# Patient Record
Sex: FEMALE | Race: BLACK OR AFRICAN AMERICAN | Employment: OTHER | URBAN - METROPOLITAN AREA
[De-identification: names, ages, dates, MRNs, and addresses within clinical notes are randomized per-mention and may not be internally consistent; named-entity substitution may affect disease eponyms.]

---

## 2017-02-20 PROBLEM — E66.09 NON MORBID OBESITY DUE TO EXCESS CALORIES: Status: ACTIVE | Noted: 2017-02-20

## 2017-12-22 ENCOUNTER — HOSPITAL ENCOUNTER (EMERGENCY)
Age: 47
Discharge: HOME | End: 2017-12-22
Attending: EMERGENCY MEDICINE
Payer: COMMERCIAL

## 2017-12-22 ENCOUNTER — APPOINTMENT (OUTPATIENT)
Dept: GENERAL RADIOLOGY | Age: 47
End: 2017-12-22
Attending: PHYSICIAN ASSISTANT
Payer: COMMERCIAL

## 2017-12-22 VITALS
SYSTOLIC BLOOD PRESSURE: 178 MMHG | RESPIRATION RATE: 18 BRPM | HEART RATE: 67 BPM | TEMPERATURE: 98.6 F | DIASTOLIC BLOOD PRESSURE: 101 MMHG
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
## 2017-12-22 DIAGNOSIS — R03.0 ELEVATED BLOOD PRESSURE READING: ICD-10-CM

## 2017-12-22 DIAGNOSIS — M25.475 SWELLING OF FIRST METATARSOPHALANGEAL (MTP) JOINT OF LEFT FOOT: ICD-10-CM

## 2017-12-22 PROCEDURE — 99283 EMERGENCY DEPT VISIT LOW MDM: CPT

## 2017-12-22 PROCEDURE — 73630 X-RAY EXAM OF FOOT: CPT

## 2017-12-22 RX ORDER — HYDROCODONE BITARTRATE AND ACETAMINOPHEN 5; 325 MG/1; MG/1
1 TABLET ORAL
Qty: 12 TAB | Refills: 0 | Status: SHIPPED | OUTPATIENT
Start: 2017-12-22 | End: 2018-01-25

## 2017-12-22 RX ORDER — NAPROXEN 500 MG/1
500 TABLET ORAL
Qty: 20 TAB | Refills: 0 | Status: SHIPPED | OUTPATIENT
Start: 2017-12-22 | End: 2018-02-21 | Stop reason: SDUPTHER

## 2017-12-22 NOTE — ED PROVIDER NOTES
EMERGENCY DEPARTMENT HISTORY AND PHYSICAL EXAM      Date: 2017  Patient Name: Belinda Arndt    History of Presenting Illness     Chief Complaint   Patient presents with    Foot Pain     non traumatic left foot pain x three days       History Provided By: Patient    HPI: Belinda Arndt, 56 y.o. female with PMHx significant for bunion, HTN, and arthritis, presents herself to the ED with cc of progressively worsening sharp left foot pain radiating up her left leg x 3 days. Pt denies any recent injury to her left foot but notes of increase of exercise lately. She notes exacerbation of pain w/ ambulation. She reports of taking 2 Aleve at 9:00 AM today (17) but found no significant improvement of pain. Pt denies being seen by a podiatrist or an orthopedist prior to ED visit. Note: Pt has driven herself to [facility].     PCP: Gilberto Diaz MD    There are no other complaints, changes, or physical findings at this time. Current Outpatient Prescriptions   Medication Sig Dispense Refill    HYDROcodone-acetaminophen (NORCO) 5-325 mg per tablet Take 1 Tab by mouth every six (6) hours as needed for Pain. Max Daily Amount: 4 Tabs. 12 Tab 0    naproxen (NAPROSYN) 500 mg tablet Take 1 Tab by mouth every twelve (12) hours as needed for Pain. 20 Tab 0    hydroCHLOROthiazide (HYDRODIURIL) 25 mg tablet TAKE ONE TABLET BY MOUTH ONCE DAILY 90 Tab 4    amLODIPine (NORVASC) 5 mg tablet Take 1 Tab by mouth daily.  80 Tab 4       Past History     Past Medical History:  Past Medical History:   Diagnosis Date    Benign essential HTN 2016    Hypertension        Past Surgical History:  Past Surgical History:   Procedure Laterality Date    HX  SECTION      ensure tubal blockade       Family History:  Family History   Problem Relation Age of Onset    Hypertension Mother     Hypertension Maternal Grandmother     Diabetes Maternal Grandmother     Cancer Maternal Grandmother      breast ca       Social History:  Social History   Substance Use Topics    Smoking status: Never Smoker    Smokeless tobacco: Never Used    Alcohol use Yes       Allergies: Allergies   Allergen Reactions    Lisinopril Cough     Review of Systems   Review of Systems   Constitutional: Negative. Negative for fever. HENT: Negative. Eyes: Negative. Respiratory: Negative. Cardiovascular: Negative. Gastrointestinal: Negative. Negative for constipation, diarrhea, nausea and vomiting. Denies liver disease   Endocrine:        Denies thyroid disease   Genitourinary: Negative. Negative for dysuria. Denies kidney disease   Musculoskeletal: Positive for myalgias (left foot). Skin: Negative. Neurological: Negative. All other systems reviewed and are negative. Physical Exam   Physical Exam   Constitutional: She is oriented to person, place, and time. She appears well-developed and well-nourished. No distress. HENT:   Head: Normocephalic and atraumatic. Right Ear: External ear normal.   Left Ear: External ear normal.   Nose: Nose normal.   Mouth/Throat: Oropharynx is clear and moist. No oropharyngeal exudate. Eyes: Conjunctivae and EOM are normal. Pupils are equal, round, and reactive to light. Right eye exhibits no discharge. Left eye exhibits no discharge. No scleral icterus. Neck: Normal range of motion. Neck supple. No tracheal deviation present. Cardiovascular: Normal rate, regular rhythm, normal heart sounds and intact distal pulses. Exam reveals no gallop and no friction rub. No murmur heard. Pulmonary/Chest: Effort normal and breath sounds normal. No respiratory distress. She has no wheezes. She has no rales. She exhibits no tenderness. Abdominal: Soft. Bowel sounds are normal. She exhibits no distension and no mass. There is no tenderness. There is no rebound and no guarding. Musculoskeletal:   Tenderness over the 1st left NTP. Slight medial deformities to BL foot (1st NTP).  No red streak. No bony tenderness over the left foot and hind. No erythema or edema. Capillary refill <3 seconds. Lymphadenopathy:     She has no cervical adenopathy. Neurological: She is alert and oriented to person, place, and time. No cranial nerve deficit. Skin: Skin is warm and dry. No rashes, lesions, or cracked skin to the left foot. Psychiatric: She has a normal mood and affect. Her behavior is normal.   Nursing note and vitals reviewed. Diagnostic Study Results     Radiologic Studies -   XR FOOT LT MIN 3 V   Final Result   EXAM:  XR FOOT LT MIN 3 V     INDICATION:   pain in medial eminence and lateral side of foot.     COMPARISON:  None.     FINDINGS:  Three views of the left foot demonstrate no fracture or other acute  osseous or articular abnormality. There is ventral soft tissue swelling.     IMPRESSION  IMPRESSION:  Soft tissue swelling. Medical Decision Making   I am the first provider for this patient. I reviewed the vital signs, available nursing notes, past medical history, past surgical history, family history and social history. Vital Signs-Reviewed the patient's vital signs. Patient Vitals for the past 12 hrs:   Temp Pulse Resp BP   12/22/17 1547 98.6 °F (37 °C) 66 18 (!) 178/100     Records Reviewed: Old Medical Records    Provider Notes (Medical Decision Making):   DDx: sprain, strain, fracture, bunion, tendonitis, arthritis, repetitive use of left foot    10:58 PM  I was personally available for consultation in the emergency department. I have reviewed the chart and agree with the documentation recorded by the [facility], including the assessment, treatment plan, and disposition. Kathy Moreno. Asia Boggs MD      ED Course:   Initial assessment performed. The patients presenting problems have been discussed, and they are in agreement with the care plan formulated and outlined with them. I have encouraged them to ask questions as they arise throughout their visit.     Procedure Note - Ace Wrap Placement:  6:04 PM  Performed by: Grace Ramachandran PA-C  Neurovascularly intact prior to tx. An Ace Wrap was placed on pt's left foot. Joint was placed in neutral position. Neurovascularly intact after tx. The procedure took 1-15 minutes, and pt tolerated well. Written by Mary Lezama, ED Scribe, as dictated by Grace Ramachandran PA-C. PROGRESS NOTE:   6:16 PM  Pt has been re-examined by Grace Ramachandran PA-C. Pt denies any lightheadedness or blurred vision. Recommends having her blood pressure checked by her PCP when she f/u with him. Discharge Note:  6:16 PM  The pt is ready for discharge. The pt's signs, symptoms, diagnosis, and discharge instructions have been discussed and pt has conveyed their understanding. The pt is to follow up as recommended or return to ER should their symptoms worsen. Plan has been discussed and pt is in agreement. PLAN:  1. Current Discharge Medication List      START taking these medications    Details   HYDROcodone-acetaminophen (NORCO) 5-325 mg per tablet Take 1 Tab by mouth every six (6) hours as needed for Pain. Max Daily Amount: 4 Tabs. Qty: 12 Tab, Refills: 0    Associated Diagnoses: Repetitive use syndrome      naproxen (NAPROSYN) 500 mg tablet Take 1 Tab by mouth every twelve (12) hours as needed for Pain. Qty: 20 Tab, Refills: 0           2. Follow-up Information     Follow up With Details Comments Tara [address], [address] [facility]      Vivian Velasquez MD  orthopedic surgeon, if no improvement [address]  [facility]  [phone]      [facility] EMERGENCY DEPT  If symptoms worsen [address]  [address] [facility]  [phone]        Return to ED if worse     Diagnosis     Clinical Impression:   1. Repetitive use syndrome    2. Swelling of first metatarsophalangeal (MTP) joint of left foot    3. Elevated blood pressure reading        Attestations:   This note is prepared by [employer], acting as Scribe for [employer]. Amee Shelton PA-C: The scribe's documentation has been prepared under my direction and personally reviewed by me in its entirety. I confirm that the note above accurately reflects all work, treatment, procedures, and medical decision making performed by me.

## 2017-12-22 NOTE — DISCHARGE INSTRUCTIONS
Elevated Blood Pressure: Care Instructions  Your Care Instructions    Blood pressure is a measure of how hard the blood pushes against the walls of your arteries. It's normal for blood pressure to go up and down throughout the day. But if it stays up over time, you have high blood pressure. Two numbers tell you your blood pressure. The first number is the systolic pressure. It shows how hard the blood pushes when your heart is pumping. The second number is the diastolic pressure. It shows how hard the blood pushes between heartbeats, when your heart is relaxed and filling with blood. An ideal blood pressure in adults is less than 120/80 (say \"120 over 80\"). High blood pressure is 140/90 or higher. You have high blood pressure if your top number is 140 or higher or your bottom number is 90 or higher, or both. The main test for high blood pressure is simple, fast, and painless. To diagnose high blood pressure, your doctor will test your blood pressure at different times. After testing your blood pressure, your doctor may ask you to test it again when you are home. If you are diagnosed with high blood pressure, you can work with your doctor to make a long-term plan to manage it. Follow-up care is a key part of your treatment and safety. Be sure to make and go to all appointments, and call your doctor if you are having problems. It's also a good idea to know your test results and keep a list of the medicines you take. How can you care for yourself at home? · Do not smoke. Smoking increases your risk for heart attack and stroke. If you need help quitting, talk to your doctor about stop-smoking programs and medicines. These can increase your chances of quitting for good. · Stay at a healthy weight. · Try to limit how much sodium you eat to less than 2,300 milligrams (mg) a day. Your doctor may ask you to try to eat less than 1,500 mg a day. · Be physically active.  Get at least 30 minutes of exercise on most days of the week. Walking is a good choice. You also may want to do other activities, such as running, swimming, cycling, or playing tennis or team sports. · Avoid or limit alcohol. Talk to your doctor about whether you can drink any alcohol. · Eat plenty of fruits, vegetables, and low-fat dairy products. Eat less saturated and total fats. · Learn how to check your blood pressure at home. When should you call for help? Call your doctor now or seek immediate medical care if:  ? · Your blood pressure is much higher than normal (such as 180/110 or higher). ? · You think high blood pressure is causing symptoms such as:  ¨ Severe headache. ¨ Blurry vision. ? Watch closely for changes in your health, and be sure to contact your doctor if:  ? · You do not get better as expected. Where can you learn more? Go to http://miguelangel-edwina.info/. Enter H884 in the search box to learn more about \"Elevated Blood Pressure: Care Instructions. \"  Current as of: September 21, 2016  Content Version: 11.4  © 6098-6569 [employer]wise, [employer]. Care instructions adapted under license by [employer] (which disclaims liability or warranty for this information). If you have questions about a medical condition or this instruction, always ask your healthcare professional. [address] any warranty or liability for your use of this information.

## 2017-12-22 NOTE — ED NOTES
ANYA Harrison reviewed discharge instructions with the patient. The patient verbalized understanding. All questions and concerns were addressed. The patient declined a wheelchair and is discharged ambulatory in the care of family members with instructions and prescriptions in hand. Pt is alert and oriented x 4. Respirations are clear and unlabored.

## 2019-11-14 PROBLEM — E66.01 SEVERE OBESITY (HCC): Status: ACTIVE | Noted: 2019-11-14

## 2021-03-19 PROBLEM — M10.9 GOUT: Status: ACTIVE | Noted: 2020-10-01

## 2021-07-15 NOTE — ED NOTES
Complaint of left foot pain x 3 days. Pain is localized to medial eminence and lateral side of foot. These areas are painful to even light touch. The lateral side of the foot appears to be minimally swollen.  Pt denies injury Continue current management.

## 2022-03-19 PROBLEM — M10.9 GOUT: Status: ACTIVE | Noted: 2020-10-01

## 2022-03-19 PROBLEM — E66.01 MORBID OBESITY (HCC): Status: ACTIVE | Noted: 2019-11-14

## 2022-03-19 PROBLEM — E66.09 NON MORBID OBESITY DUE TO EXCESS CALORIES: Status: ACTIVE | Noted: 2017-02-20

## 2022-07-13 ENCOUNTER — HOSPITAL ENCOUNTER (OUTPATIENT)
Dept: GENERAL RADIOLOGY | Age: 52
Discharge: HOME | End: 2022-07-13
Payer: COMMERCIAL

## 2022-07-13 DIAGNOSIS — Z01.818 PRE-OP EXAMINATION: ICD-10-CM

## 2022-07-13 PROCEDURE — 71046 X-RAY EXAM CHEST 2 VIEWS: CPT

## 2022-10-19 ENCOUNTER — HOSPITAL ENCOUNTER (OUTPATIENT)
Dept: WOUND CARE | Age: 52
Discharge: HOME | End: 2022-10-19
Payer: COMMERCIAL

## 2022-10-19 VITALS
RESPIRATION RATE: 16 BRPM | HEART RATE: 79 BPM | DIASTOLIC BLOOD PRESSURE: 105 MMHG | SYSTOLIC BLOOD PRESSURE: 170 MMHG | TEMPERATURE: 98.3 F

## 2022-10-19 PROBLEM — S21.009A: Status: ACTIVE | Noted: 2022-10-19

## 2022-10-19 PROCEDURE — 99203 OFFICE O/P NEW LOW 30 MIN: CPT

## 2022-10-19 PROCEDURE — 99213 OFFICE O/P EST LOW 20 MIN: CPT

## 2022-10-19 NOTE — WOUND CARE
10/19/22 0920   [REMOVED] Wound Breast Left 10/19/22   Final Assessment Date/Final Assessment Time: 10/19/22 1019  Date First Assessed/Time First Assessed: 10/19/22 0920   Present on Hospital Admission: Yes  Wound Approximate Age at First Assessment (Weeks): 6 weeks  Primary Wound Type: Open incision/surg. .. Wound Image    Wound Etiology Surgical   Dressing Status Intact; Old drainage noted   Cleansed Cleansed with saline   Wound Length (cm) 6 cm   Wound Width (cm) 5 cm   Wound Depth (cm) 0.2 cm   Wound Surface Area (cm^2) 30 cm^2   Wound Volume (cm^3) 6 cm^3   Wound Assessment [place]/red;Slough   Drainage Amount Moderate   Drainage Description Serosanguinous   Wound Odor None   Tiffany-Wound/Incision Assessment Intact   Edges Flat/open edges   Wound Thickness Description Full thickness   [REMOVED] Wound Breast Right 10/19/22   Final Assessment Date/Final Assessment Time: 10/19/22 1019  Date First Assessed/Time First Assessed: 10/19/22 0920   Present on Hospital Admission: Yes  Wound Approximate Age at First Assessment (Weeks): 6 weeks  Primary Wound Type: Open incision/surg. .. Wound Image    Wound Etiology Surgical   Dressing Status Intact; Old drainage noted   Cleansed Cleansed with saline   Wound Length (cm) 4.3 cm   Wound Width (cm) 3.2 cm   Wound Depth (cm) 0.2 cm   Wound Surface Area (cm^2) 13.76 cm^2   Wound Volume (cm^3) 2.752 cm^3   Wound Assessment [place]/red;Slough   Drainage Amount Moderate   Drainage Description Serosanguinous   Wound Odor None   Tiffany-Wound/Incision Assessment Intact   Edges Flat/open edges   Wound Thickness Description Full thickness   Pain 1   Pain Scale 1 Numeric (0 - 10)   Pain Intensity 1 0   Visit Vitals  BP (!) 170/105 (BP 1 Location: Left upper arm, BP Patient Position: At rest;Sitting) Comment: pt asymptomatic. MD notified.    Pulse 79   Temp 98.3 °F (36.8 °C)   Resp 16

## 2022-10-19 NOTE — WOUND CARE
10/19/22 0920   Wound Breast Left 10/19/22   Date First Assessed/Time First Assessed: 10/19/22 0920   Present on Hospital Admission: Yes  Wound Approximate Age at First Assessment (Weeks): 6 weeks  Primary Wound Type: Open incision/surgical site  Location: Breast  Wound Location Orientation: Lef. .. Wound Image    Wound Etiology Surgical   Dressing Status Intact; Old drainage noted   Cleansed Cleansed with saline   Wound Length (cm) 6 cm   Wound Width (cm) 5 cm   Wound Depth (cm) 0.2 cm   Wound Surface Area (cm^2) 30 cm^2   Wound Volume (cm^3) 6 cm^3   Wound Assessment [place]/red;Slough   Drainage Amount Moderate   Drainage Description Serosanguinous   Wound Odor None   Tiffany-Wound/Incision Assessment Intact   Edges Flat/open edges   Wound Thickness Description Full thickness   Wound Breast Right 10/19/22   Date First Assessed/Time First Assessed: 10/19/22 0920   Present on Hospital Admission: Yes  Wound Approximate Age at First Assessment (Weeks): 6 weeks  Primary Wound Type: Open incision/surgical site  Location: Breast  Wound Location Orientation: Rig. .. Wound Image    Wound Etiology Surgical   Dressing Status Intact; Old drainage noted   Cleansed Cleansed with saline   Wound Length (cm) 4.3 cm   Wound Width (cm) 3.2 cm   Wound Depth (cm) 0.2 cm   Wound Surface Area (cm^2) 13.76 cm^2   Wound Volume (cm^3) 2.752 cm^3   Wound Assessment [place]/red;Slough   Drainage Amount Moderate   Drainage Description Serosanguinous   Wound Odor None   Tiffany-Wound/Incision Assessment Intact   Edges Flat/open edges   Wound Thickness Description Full thickness   Pain 1   Pain Scale 1 Numeric (0 - 10)   Pain Intensity 1 0   Visit Vitals  BP (!) 170/105 (BP 1 Location: Left upper arm, BP Patient Position: At rest;Sitting)   Pulse 79   Temp 98.3 °F (36.8 °C)   Resp 16

## 2022-10-19 NOTE — WOUND CARE
Wound Care Consultation        Assessment:     47 y.o. 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 Questions answered. History of Present Illness:      Chandler Overton is a 47 y.o. female  female for follow up of Bilateral  Surgical ulcer to the  nipple necrosis  Ulcer has been present for more than one month now. Patient had breast reduction surgery in [place] on 22, she was told that her nipple may get necrosed. She has been in contact with surgeon through phone and tele visits. She does her own dressing changes with betadine. She denies any drainage/pain/fever/chills. Offloading wound: n/a  Appetite: good  Wound associated pain: none  Diabetic: no    Smoker:No      Past Medical History:   Diagnosis Date    Benign essential HTN 2016    Gout 10/2020    Ill-defined condition     Obesity (BMI 30.0-34. 9)      Past Surgical History:   Procedure Laterality Date    HX  SECTION      ensure tubal blockade    HX COLONOSCOPY  2022    f/u 10 yrs     HX OTHER SURGICAL  2022    bilateral breast reduction    HX WISDOM TEETH EXTRACTION        Family History   Problem Relation Age of Onset    Hypertension Mother     Cancer Mother         bladder    Hypertension Maternal Grandmother     Diabetes Maternal Grandmother     Cancer Maternal Grandmother         breast ca    Other Father         diverticulitis    Breast Cancer Sister      Social History     Socioeconomic History    Marital status:    Occupational History    Occupation: LPC, Nursing student   Tobacco Use    Smoking status: Never    Smokeless tobacco: Never   Vaping Use    Vaping Use: Never used   Substance and Sexual Activity    Alcohol use:  Yes     Alcohol/week: 1.0 standard drink     Types: 1 Glasses of wine per week     Comment: occasional; holidays     Drug use: No    Sexual activity: Yes     Partners: Male     Birth control/protection: Condom     Comment:  \"[place]\"      Current Outpatient Medications   Medication Sig    losartan (COZAAR) 100 mg tablet Take 1 Tablet by mouth daily. fluconazole (DIFLUCAN) 150 mg tablet Take 1 Tablet by mouth daily. FDA advises cautious prescribing of oral fluconazole in pregnancy. furosemide (LASIX) 40 mg tablet Take 1 Tablet by mouth daily. cloNIDine HCL (CATAPRES) 0.2 mg tablet Take 1 Tablet by mouth nightly. allopurinoL (ZYLOPRIM) 100 mg tablet Take 2 Tablets by mouth daily. ibuprofen (MOTRIN) 600 mg tablet Take 1 Tab by mouth every six (6) hours as needed for Pain. No current facility-administered medications for this encounter. Allergies   Allergen Reactions    Lisinopril Cough       Review of Systems:  A comprehensive review of systems was negative except for that written in the History of Present Illness. Physical Exam:     VS:   Vitals:    10/19/22 0920   BP: (!) 170/105   Pulse: 79   Resp: 16   Temp: 98.3 °F (36.8 °C)        General:  alert and oriented times 3   Ears: ENT exam normal, no neck nodes or sinus tenderness. Chest: CTA b.l  Heart: RRR S1S2  Abdomen: soft, NT, ND, BS+  Extremities: no edema         [REMOVED] Wound Breast Left 10/19/22 (Removed)   Wound Image   10/19/22 0920   Wound Etiology Surgical 10/19/22 0920   Dressing Status Intact; Old drainage noted 10/19/22 0920   Cleansed Cleansed with saline 10/19/22 0920   Wound Length (cm) 6 cm 10/19/22 0920   Wound Width (cm) 5 cm 10/19/22 0920   Wound Depth (cm) 0.2 cm 10/19/22 0920   Wound Surface Area (cm^2) 30 cm^2 10/19/22 0920   Wound Volume (cm^3) 6 cm^3 10/19/22 0920   Wound Assessment Pink/red;Slough 10/19/22 0920   Drainage Amount Moderate 10/19/22 0920   Drainage Description Serosanguinous 10/19/22 0920   Wound Odor None 10/19/22 0920 Tiffany-Wound/Incision Assessment Intact 10/19/22 0920   Edges Flat/open edges 10/19/22 0920   Wound Thickness Description Full thickness 10/19/22 0920   Number of days: 0       [REMOVED] Wound Breast Right 10/19/22 (Removed)   Wound Image   10/19/22 0920   Wound Etiology Surgical 10/19/22 0920   Dressing Status Intact; Old drainage noted 10/19/22 0920   Cleansed Cleansed with saline 10/19/22 0920   Wound Length (cm) 4.3 cm 10/19/22 0920   Wound Width (cm) 3.2 cm 10/19/22 0920   Wound Depth (cm) 0.2 cm 10/19/22 0920   Wound Surface Area (cm^2) 13.76 cm^2 10/19/22 0920   Wound Volume (cm^3) 2.752 cm^3 10/19/22 0920   Wound Assessment Pink/red;Slough 10/19/22 0920   Drainage Amount Moderate 10/19/22 0920   Drainage Description Serosanguinous 10/19/22 0920   Wound Odor None 10/19/22 0920   Tiffany-Wound/Incision Assessment Intact 10/19/22 0920   Edges Flat/open edges 10/19/22 0920   Wound Thickness Description Full thickness 10/19/22 0920   Number of days: 0        Signed By: Hannah Ni MD     October 19, 2022

## 2022-10-19 NOTE — DISCHARGE INSTRUCTIONS
Discharge Instructions/Wound Orders  [place] [address] [facility], [facility] [address]  Telephone: [phone]    NAME:  Kasia Harley OF BIRTH:  1970  MEDICAL RECORD NUMBER:  [id number]  DATE:  October 19, 2022  Wound Care Orders:  Bilateral breast wounds - Cleanse with vashe solution. Let soak for 5-7 minutes then pat dry. Cover with gauze. Change daily or more often if soiled. Dietary:  [x] Diet as tolerated: [] Calorie Diabetic Diet:Low carb and no Sugar [] No Added Salt:[x] Increase Protein: [] Other:Limit the amount of liquid you are drinking and avoid drinking in between meals   Activity:  [x] Activity as tolerated:  [] Patient has no activity restrictions     [] Strict Bedrest: [] Remain off Work:     [] May return to full duty work:                                   [] Return to work with restrictions:   Return Appointment:  [x] Return Appointment: Follow up with your plastic surgeon. [] Ordered tests:    Electronically signed [employer] RN on 10/19/2022 at 9:46 AM     [address] [place] [address] Information: Should you experience any significant changes in your wound(s) or have questions about your wound care, please contact the [address] [place] [address] at [address] [place] [address] 8:00 am - 4:30. If you need help with your wound outside these hours and cannot wait until we are again available, contact your PCP or go to the hospital emergency room. PLEASE NOTE: IF YOU ARE UNABLE TO OBTAIN WOUND SUPPLIES, CONTINUE TO USE THE SUPPLIES YOU HAVE AVAILABLE UNTIL YOU ARE ABLE TO REACH US. IT IS MOST IMPORTANT TO KEEP THE WOUND COVERED AT ALL TIMES.      Physician Signature:_______________________    Date: ___________ Time:  ____________